# Patient Record
Sex: MALE | Race: OTHER | HISPANIC OR LATINO | URBAN - METROPOLITAN AREA
[De-identification: names, ages, dates, MRNs, and addresses within clinical notes are randomized per-mention and may not be internally consistent; named-entity substitution may affect disease eponyms.]

---

## 2018-11-07 ENCOUNTER — EMERGENCY (EMERGENCY)
Facility: HOSPITAL | Age: 43
LOS: 1 days | Discharge: HOME | End: 2018-11-07
Attending: INTERNAL MEDICINE | Admitting: EMERGENCY MEDICINE
Payer: MEDICAID

## 2018-11-07 VITALS
OXYGEN SATURATION: 99 % | RESPIRATION RATE: 14 BRPM | TEMPERATURE: 98 F | DIASTOLIC BLOOD PRESSURE: 71 MMHG | SYSTOLIC BLOOD PRESSURE: 103 MMHG | HEART RATE: 68 BPM

## 2018-11-07 VITALS
WEIGHT: 164.91 LBS | RESPIRATION RATE: 18 BRPM | HEART RATE: 100 BPM | DIASTOLIC BLOOD PRESSURE: 88 MMHG | SYSTOLIC BLOOD PRESSURE: 123 MMHG | TEMPERATURE: 98 F | OXYGEN SATURATION: 95 % | HEIGHT: 69 IN

## 2018-11-07 LAB
ALBUMIN SERPL ELPH-MCNC: 3.8 G/DL — SIGNIFICANT CHANGE UP (ref 3.3–5)
ALP SERPL-CCNC: 87 U/L — SIGNIFICANT CHANGE UP (ref 40–120)
ALT FLD-CCNC: 44 U/L DA — SIGNIFICANT CHANGE UP (ref 10–45)
ANION GAP SERPL CALC-SCNC: 9 MMOL/L — SIGNIFICANT CHANGE UP (ref 5–17)
APTT BLD: 25.9 SEC — LOW (ref 27.5–36.3)
AST SERPL-CCNC: 25 U/L — SIGNIFICANT CHANGE UP (ref 10–40)
BASOPHILS # BLD AUTO: 0.3 K/UL — HIGH (ref 0–0.2)
BASOPHILS NFR BLD AUTO: 2.4 % — HIGH (ref 0–2)
BILIRUB SERPL-MCNC: 0.2 MG/DL — SIGNIFICANT CHANGE UP (ref 0.2–1.2)
BUN SERPL-MCNC: 11 MG/DL — SIGNIFICANT CHANGE UP (ref 7–23)
CALCIUM SERPL-MCNC: 9.2 MG/DL — SIGNIFICANT CHANGE UP (ref 8.4–10.5)
CHLORIDE SERPL-SCNC: 104 MMOL/L — SIGNIFICANT CHANGE UP (ref 96–108)
CK SERPL-CCNC: 267 U/L — HIGH (ref 30–200)
CO2 SERPL-SCNC: 24 MMOL/L — SIGNIFICANT CHANGE UP (ref 22–31)
CREAT SERPL-MCNC: 1.22 MG/DL — SIGNIFICANT CHANGE UP (ref 0.5–1.3)
EOSINOPHIL # BLD AUTO: 0.7 K/UL — HIGH (ref 0–0.5)
EOSINOPHIL NFR BLD AUTO: 5.5 % — SIGNIFICANT CHANGE UP (ref 0–6)
GLUCOSE SERPL-MCNC: 101 MG/DL — HIGH (ref 70–99)
HCT VFR BLD CALC: 43.2 % — SIGNIFICANT CHANGE UP (ref 39–50)
HGB BLD-MCNC: 14.8 G/DL — SIGNIFICANT CHANGE UP (ref 13–17)
INR BLD: 0.99 RATIO — SIGNIFICANT CHANGE UP (ref 0.88–1.16)
LYMPHOCYTES # BLD AUTO: 23.7 % — SIGNIFICANT CHANGE UP (ref 13–44)
LYMPHOCYTES # BLD AUTO: 3 K/UL — SIGNIFICANT CHANGE UP (ref 1–3.3)
MCHC RBC-ENTMCNC: 29.2 PG — SIGNIFICANT CHANGE UP (ref 27–34)
MCHC RBC-ENTMCNC: 34.2 GM/DL — SIGNIFICANT CHANGE UP (ref 32–36)
MCV RBC AUTO: 85.1 FL — SIGNIFICANT CHANGE UP (ref 80–100)
MONOCYTES # BLD AUTO: 0.7 K/UL — SIGNIFICANT CHANGE UP (ref 0–0.9)
MONOCYTES NFR BLD AUTO: 5.3 % — SIGNIFICANT CHANGE UP (ref 2–14)
NEUTROPHILS # BLD AUTO: 7.9 K/UL — HIGH (ref 1.8–7.4)
NEUTROPHILS NFR BLD AUTO: 63 % — SIGNIFICANT CHANGE UP (ref 43–77)
PHENYTOIN FREE SERPL-MCNC: 3.6 UG/ML — LOW (ref 10–20)
PLATELET # BLD AUTO: 211 K/UL — SIGNIFICANT CHANGE UP (ref 150–400)
POTASSIUM SERPL-MCNC: 3.7 MMOL/L — SIGNIFICANT CHANGE UP (ref 3.5–5.3)
POTASSIUM SERPL-SCNC: 3.7 MMOL/L — SIGNIFICANT CHANGE UP (ref 3.5–5.3)
PROT SERPL-MCNC: 7.2 G/DL — SIGNIFICANT CHANGE UP (ref 6–8.3)
PROTHROM AB SERPL-ACNC: 11.1 SEC — SIGNIFICANT CHANGE UP (ref 10–12.9)
RBC # BLD: 5.08 M/UL — SIGNIFICANT CHANGE UP (ref 4.2–5.8)
RBC # FLD: 12 % — SIGNIFICANT CHANGE UP (ref 10.3–14.5)
SODIUM SERPL-SCNC: 137 MMOL/L — SIGNIFICANT CHANGE UP (ref 135–145)
TROPONIN I SERPL-MCNC: <.017 NG/ML — LOW (ref 0.02–0.06)
WBC # BLD: 12.6 K/UL — HIGH (ref 3.8–10.5)
WBC # FLD AUTO: 12.6 K/UL — HIGH (ref 3.8–10.5)

## 2018-11-07 PROCEDURE — 85027 COMPLETE CBC AUTOMATED: CPT

## 2018-11-07 PROCEDURE — 96365 THER/PROPH/DIAG IV INF INIT: CPT

## 2018-11-07 PROCEDURE — 84484 ASSAY OF TROPONIN QUANT: CPT

## 2018-11-07 PROCEDURE — 99284 EMERGENCY DEPT VISIT MOD MDM: CPT

## 2018-11-07 PROCEDURE — 93010 ELECTROCARDIOGRAM REPORT: CPT

## 2018-11-07 PROCEDURE — 80053 COMPREHEN METABOLIC PANEL: CPT

## 2018-11-07 PROCEDURE — 93005 ELECTROCARDIOGRAM TRACING: CPT

## 2018-11-07 PROCEDURE — 99284 EMERGENCY DEPT VISIT MOD MDM: CPT | Mod: 25

## 2018-11-07 PROCEDURE — 80185 ASSAY OF PHENYTOIN TOTAL: CPT

## 2018-11-07 PROCEDURE — 82550 ASSAY OF CK (CPK): CPT

## 2018-11-07 PROCEDURE — 85730 THROMBOPLASTIN TIME PARTIAL: CPT

## 2018-11-07 PROCEDURE — 85610 PROTHROMBIN TIME: CPT

## 2018-11-07 RX ORDER — SODIUM CHLORIDE 9 MG/ML
3 INJECTION INTRAMUSCULAR; INTRAVENOUS; SUBCUTANEOUS ONCE
Qty: 0 | Refills: 0 | Status: COMPLETED | OUTPATIENT
Start: 2018-11-07 | End: 2018-11-07

## 2018-11-07 RX ADMIN — Medication 500 MILLIGRAM(S): at 19:25

## 2018-11-07 RX ADMIN — SODIUM CHLORIDE 3 MILLILITER(S): 9 INJECTION INTRAMUSCULAR; INTRAVENOUS; SUBCUTANEOUS at 17:36

## 2018-11-07 RX ADMIN — Medication 500 MILLIGRAM(S): at 20:52

## 2018-11-07 RX ADMIN — Medication 120 MILLIGRAM(S): at 18:25

## 2018-11-07 RX ADMIN — Medication 120 MILLIGRAM(S): at 20:22

## 2018-11-07 NOTE — ED PROVIDER NOTE - NS ED ROS FT
Constitutional: (-) fever  (-)chills  (-)sweats  Eyes/ENT: (-) blurry vision, (-) epistaxis  (-)rhinorrhea   (-) sore throat    Cardiovascular: (-) chest pain, (-) palpitations (-) edema   Respiratory: (-) cough, (-) shortness of breath   Gastrointestinal: (-)nausea  (-)vomiting, (-) diarrhea  (-) abdominal pain   :  (-)dysuria, (-)frequency, (-)urgency, (-)hematuria  Musculoskeletal: (-) neck pain, (-) back pain, (-) joint pain  Integumentary: (-) rash, (-) edema  Neurological: (-) headache, (-) altered mental status  (-)LOC, seizure

## 2018-11-07 NOTE — ED ADULT NURSE REASSESSMENT NOTE - NS ED NURSE REASSESS COMMENT FT1
Recieved pt in Bed 9 from [facility]. pt is A&Ox3 and is resting comfortably. Maintained pt on cardiac monitor. No rep. distress noted at this time. Dilantin 500 mg IV infusing via pump. Pending disposition

## 2018-11-07 NOTE — ED PROVIDER NOTE - PHYSICAL EXAMINATION
General:     NAD, well-nourished, well-appearing  HEENT + tongue bite distally   Head:     NC/AT, EOMI, oral mucosa moist  Neck:     trachea midline  Lungs:     CTA b/l, no w/r/r  CVS:     S1S2, RRR, no m  Abd:     +BS, s/nt/nd, no organomegaly  Ext:    2+ radial and pedal pulses, no c/c/e  Neuro: AAOx3, no sensory/motor deficits

## 2018-11-07 NOTE — ED ADULT NURSE NOTE - OBJECTIVE STATEMENT
Pt presents to ED s/p seizure activity as per EMS seizure was witnessed by co-workers. Pt awake and alert on arrival, denies chest pain or difficulty breathing. Presents with bruising to right forearm, pt states condition present since childhood.

## 2018-11-07 NOTE — ED ADULT NURSE NOTE - INTERVENTIONS DEFINITIONS
[place] to call system/Stretcher in lowest position, wheels locked, appropriate side rails in place/Instruct patient to call for assistance

## 2018-11-07 NOTE — ED ADULT NURSE REASSESSMENT NOTE - NS ED NURSE REASSESS COMMENT FT1
Received 2nd dose od Dilantin 500 mg IV from Pharmacy. Medication hung and infusing via pump. Vitals stable. Pending disposition

## 2021-09-17 NOTE — ED ADULT NURSE NOTE - FINAL NURSING ELECTRONIC SIGNATURE
07-Nov-2018 20:18 07-Nov-2018 20:58 Hatchet Flap Text: The defect edges were debeveled with a #15 scalpel blade.  Given the location of the defect, shape of the defect and the proximity to free margins a hatchet flap was deemed most appropriate.  Using a sterile surgical marker, an appropriate hatchet flap was drawn incorporating the defect and placing the expected incisions within the relaxed skin tension lines where possible.    The area thus outlined was incised deep to adipose tissue with a #15 scalpel blade.  The skin margins were undermined to an appropriate distance in all directions utilizing iris scissors.

## 2022-06-17 NOTE — ED PROVIDER NOTE - NSCAREINITIATED _GEN_ER
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 Lantus dec to 10 units qhs by endocrine. HD per nephro. Trops downtrended, was likely elevated from ESRD.    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 Lantus dec to 10 units qhs by endocrine. HD per nephro. Trops downtrended, was likely elevated from ESRD.       Problem/Plan - 1:  ·  Problem: Hypoglycemia.  ·  Plan: - Patient presented with c/o generalized weakness and fall and was found to have blood sugar in 30's  - S/p Dextrose 50% IV X1 in the ED and D5 NS 1L X1 with appropriate response  - Patient with hx of T2DM, on Lantus 40 units qhs not on any oral hypoglycemics per outpatient pharmacy   - [facility] ac/hs  - endocrine consulted.     Problem/Plan - 2:  ·  Problem: SIRS (systemic inflammatory response syndrome).  ·  Plan: - Patient presented to the ED with the c/o Generalized weakness and possible fall and was found to have hypothermia 94.9 and leukocytosis of 16.82  - Patient meets SIRS criteria -   - UA unremarkable, no abdominal pain, CXR with no infiltrates seen on wet read   - S/P ceftriaxone in the ED - will monitor off Abx  - Patient was found to be hypoglycemic on arrival to the ED with POCS in 30's  - Hypothermia and leukocytosis likely reactive in the setting of hypoglycemia  - S/p Dextrose 50% IV X1 in the ED and D5 NS 1L X1 with appropriate response  - will monitor off Abx pending ID recommendations   - CT BRAIN: No acute intracranial bleeding. Central volume loss, chronic microvascular ischemic changes, and chronic bilateral lacunar infarctions.  - CT CERVICAL SPINE: No fracture. Grade 1 C4-C5 anterolisthesis. C6-C7 disc degeneration. Bilateral pleural effusions and interlobular septal thickening due to interstitial edema.  - CXR: no clear evidence of PNA  - F/U CT chest   - Trend WBC and monitor for fever  - F/u UA, UCx, BCx x2  - Lactate 2 on admission, f/u repeat lactate   - ID Dr. Tsai consulted, will appreciate recs.     Problem/Plan - 3:  ·  Problem: Pleural effusion.   ·  Plan: - No Hx of pulmonary disease  - Patient does not c/o cough, fever or chills  - CT Cervical shoes Bilateral pleural effusions and interlobular septal thickening due to interstitial edema.  - F/U dedicated CT chest  - s/p ceftriaxone in the ED - will monitor off Abx pending  ct results  - ID Dr. Tsai, consulted, will appreciate recs.     Problem/Plan - 4:  ·  Problem: HFrEF (heart failure with reduced ejection fraction).   ·  Plan: - patient with hx of HFrEF  - last ECHO in 04/22 shows EF of 45%  - Admission labs show ProBNP of [id number]  - Likely elevated in the setting of ESRD  - Consult Dr. James, will appreciate recs   - Avoid excessive fluids.     Problem/Plan - 5:  ·  Problem: Elevated troponin.   ·  Plan: - Admission labs show elevated Troponin of 275.9  - Patient denies any chest pain, sob or palpitations  - EKG shows NSR with left axis deviation with non specific ST and T waves changes  - elevated troponin likely due to ESRD  - trend x3 or to peak.     Problem/Plan - 6:  ·  Problem: ESRD on hemodialysis.   ·  Plan: - Patient with hx of ESRD  - On HD TTS schedule  - admission eGFR 9  - Follows Dr. Edwards as out patient, will consult Dr. Edwards, appreciate recs.     Problem/Plan - 7:  ·  Problem: T2DM (type 2 diabetes mellitus).   ·  Plan: - Chronic stable  - On Lantus 40 qhs  - Will start on lantus 20 units qhs with LDSS given hypoglycemic episode  - Accu checks AC/HS  - F/U A1C  - Adjust insulin requirement based on blood sugar levels  - per outpatient pharmacy patient recently rx ademolog however has not yet picked up and pharmacy unsure of dose   - endocrinology consulted, Dr. Perlman.     Problem/Plan - 8:  ·  Problem: Atrial fibrillation.   ·  Plan: - Patient with hx of P A.Fib  - Not on any AC because of hx of multiple falls   - on Lopressor 100mg q12 and Diltiazem 60mg q8 - will continue with hold parameters  - EKG shows NSR with left axis deviation with non specific ST and T waves changes.     Problem/Plan - 9:  ·  Problem: Benign essential HTN.   ·  Plan: - Chronic stable  - VSS  - Continue Amlodipine 5 mg with hold parameters  - Dash/Renal Diet  - continue to monitor routine hemodynamics.     Problem/Plan - 10:  ·  Problem: HLD (hyperlipidemia).   ·  Plan; - Chronic stable  - On atorvastatin 40mg at home - will continue  - Dash/Renal diet.     Problem/Plan - 11:  ·  Problem: Depression, major.   ·  Plan: - Chronic stable  - Continue imipramine 50qhs.     Problem/Plan - 12:  ·  Problem: Need for prophylactic measure.   ·  Plan: - DVT Prophylaxis: Heparin 5000 units q12. 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 Lantus dec to 10 units qhs by endocrine. HD per nephro. Trops downtrended, was likely elevated from ESRD.    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 Lantus dec to 10 units qhs by endocrine. HD per nephro. Trops downtrended, was likely elevated from ESRD. will need cardiac clearance prior to any procedure or intervention.     Problem/Plan - 1:  ·  Problem: Hypoglycemia.  ·  Plan: - Patient presented with c/o generalized weakness and fall and was found to have blood sugar in 30's  - S/p Dextrose 50% IV X1 in the ED and D5 NS 1L X1 with appropriate response  - Patient with hx of T2DM, on Lantus 40 units qhs not on any oral hypoglycemics per outpatient pharmacy   [facility]  - endocrine consulted.     Problem/Plan - 2:  ·  Problem: SIRS (systemic inflammatory response syndrome).  ·  Plan: - Patient presented to the ED with the c/o Generalized weakness and possible fall and was found to have hypothermia 94.9 and leukocytosis of 16.82  - Patient meets SIRS criteria -   - UA unremarkable, no abdominal pain, CXR with no infiltrates seen on wet read   - S/P ceftriaxone in the ED - will monitor off Abx  - Patient was found to be hypoglycemic on arrival to the ED with POCS in 30's  - Hypothermia and leukocytosis likely reactive in the setting of hypoglycemia  - S/p Dextrose 50% IV X1 in the ED and D5 NS 1L X1 with appropriate response  - will monitor off Abx pending ID recommendations   - CT BRAIN: No acute intracranial bleeding. Central volume loss, chronic microvascular ischemic changes, and chronic bilateral lacunar infarctions.  - CT CERVICAL SPINE: No fracture. Grade 1 C4-C5 anterolisthesis. C6-C7 disc degeneration. Bilateral pleural effusions and interlobular septal thickening due to interstitial edema.  - CXR: no clear evidence of PNA  - F/U CT chest   - Trend WBC and monitor for fever  - F/u UA, UCx, BCx x2  - Lactate 2 on admission, f/u repeat lactate   - ID Dr. Tsai consulted, will appreciate recs.     Problem/Plan - 3:  ·  Problem: Pleural effusion.   ·  Plan: - No Hx of pulmonary disease  - Patient does not c/o cough, fever or chills  - CT Cervical shoes Bilateral pleural effusions and interlobular septal thickening due to interstitial edema.  - F/U dedicated CT chest  - s/p ceftriaxone in the ED - will monitor off Abx pending  ct results  - ID Dr. Tsai, consulted, will appreciate recs.     Problem/Plan - 4:  ·  Problem: HFrEF (heart failure with reduced ejection fraction).   ·  Plan: - patient with hx of HFrEF  - last ECHO in 04/22 shows EF of 45%  - Admission labs show ProBNP of [id number]  - Likely elevated in the setting of ESRD  - Consult Dr. James, will appreciate recs   - Avoid excessive fluids.     Problem/Plan - 5:  ·  Problem: Elevated troponin.   ·  Plan: - Admission labs show elevated Troponin of 275.9  - Patient denies any chest pain, sob or palpitations  - EKG shows NSR with left axis deviation with non specific ST and T waves changes  - elevated troponin likely due to ESRD  - trend x3 or to peak.     Problem/Plan - 6:  ·  Problem: ESRD on hemodialysis.   ·  Plan: - Patient with hx of ESRD  - On HD TTS schedule  - admission eGFR 9  - Follows Dr. Edwards as out patient, will consult Dr. Edwards, appreciate recs.     Problem/Plan - 7:  ·  Problem: T2DM (type 2 diabetes mellitus).   ·  Plan: - Chronic stable  - On Lantus 40 qhs  - Will start on lantus 20 units qhs with LDSS given hypoglycemic episode  - Accu checks AC/HS  - F/U A1C  - Adjust insulin requirement based on blood sugar levels  - per outpatient pharmacy patient recently rx ademolog however has not yet picked up and pharmacy unsure of dose   - endocrinology consulted, Dr. Perlman.     Problem/Plan - 8:  ·  Problem: Atrial fibrillation.   ·  Plan: - Patient with hx of P A.Fib  - Not on any AC because of hx of multiple falls   - on Lopressor 100mg q12 and Diltiazem 60mg q8 - will continue with hold parameters  - EKG shows NSR with left axis deviation with non specific ST and T waves changes.     Problem/Plan - 9:  ·  Problem: Benign essential HTN.   ·  Plan: - Chronic stable  - VSS  - Continue Amlodipine 5 mg with hold parameters  - Dash/Renal Diet  - continue to monitor routine hemodynamics.     Problem/Plan - 10:  ·  Problem: HLD (hyperlipidemia).   ·  Plan; - Chronic stable  - On atorvastatin 40mg at home - will continue  - Dash/Renal diet.     Problem/Plan - 11:  ·  Problem: Depression, major.   ·  Plan: - Chronic stable  - Continue imipramine 50qhs.     Problem/Plan - 12:  ·  Problem: Need for prophylactic measure.   ·  Plan: - DVT Prophylaxis: Heparin 5000 units q12.